# Patient Record
(demographics unavailable — no encounter records)

---

## 2025-04-25 NOTE — HISTORY OF PRESENT ILLNESS
[de-identified] : 38 y/o F presents with sinus pain fluctuating for 8 months. Currently with 2-3 days of sinus pain, left>right malar pressure, pressure along left eyebrow, upper left dental pain, and left aural fullness. Associated nasal congestion, yellow/green rhinorrhea and postnasal drainage, decreased smell. Fatigue as well. No fevers, chills. Known seasonal allergies in the Spring, this season not as bad as usual. Symptoms started with allergic sx and increased sneezing. Took sudafed this morning. Netipot daily. No flonase. Occasional allegra.   Reports pattern of symptoms frequent colds since September, typically only one per year and she is concerned she has a sinus infection. History of sinusitis, last antibiotics in 2016. No prior sinus surgery. History of chronic sinusitis age 12, treated with 1 month of oral antibiotics. Reports History of epistaxis, eletrocautery in 2016. Known bruxism wears a bite splint for clenching. Nonsmoker. No fh relevant to cc.

## 2025-04-25 NOTE — PHYSICAL EXAM
[Midline] : trachea located in midline position [Nasal Endoscopy Performed] : nasal endoscopy was performed, see procedure section for findings [de-identified] : engorged [Normal] : no rashes [de-identified] : gait steady

## 2025-04-25 NOTE — PROCEDURE
[Posterior Lesion] : posterior lesion [Anterior rhinoscopy insufficient to account for symptoms] : anterior rhinoscopy insufficient to account for symptoms [Topical Lidocaine] : topical lidocaine [Oxymetazoline HCl] : oxymetazoline HCl [Congested] : congested [Deviated to the Lt] : deviated to the left [] : purulence present on the left [Normal] : the nasopharynx was normal [de-identified] : done for nasal drainage, facial pressure to ro sinusitis, could not see with mirror [FreeTextEntry6] : - done to eval sinus pain, postnasal drip, hyposmia, congestion found to have l mucopurulent drainage posterior ethmoids consistent with sinusitis and left septal deviation  Mucosa: b congested Mucus:b nl; +l mucopurulent drainage posterior ethmoids  Polyps:b nl Inferior turbinate:b nl Middle turbinate:b nl Superior turbinate:b nl Inferior Meatus:b nl Middle Meatus:b nl Superior meatus:b nl Sphenoethmoidal recess:b nl

## 2025-04-25 NOTE — PHYSICAL EXAM
[Midline] : trachea located in midline position [Nasal Endoscopy Performed] : nasal endoscopy was performed, see procedure section for findings [de-identified] : engorged [Normal] : no rashes [de-identified] : gait steady

## 2025-04-25 NOTE — PROCEDURE
[Posterior Lesion] : posterior lesion [Anterior rhinoscopy insufficient to account for symptoms] : anterior rhinoscopy insufficient to account for symptoms [Topical Lidocaine] : topical lidocaine [Oxymetazoline HCl] : oxymetazoline HCl [Congested] : congested [Deviated to the Lt] : deviated to the left [] : purulence present on the left [Normal] : the nasopharynx was normal [de-identified] : done for nasal drainage, facial pressure to ro sinusitis, could not see with mirror [FreeTextEntry6] : - done to eval sinus pain, postnasal drip, hyposmia, congestion found to have l mucopurulent drainage posterior ethmoids consistent with sinusitis and left septal deviation  Mucosa: b congested Mucus:b nl; +l mucopurulent drainage posterior ethmoids  Polyps:b nl Inferior turbinate:b nl Middle turbinate:b nl Superior turbinate:b nl Inferior Meatus:b nl Middle Meatus:b nl Superior meatus:b nl Sphenoethmoidal recess:b nl

## 2025-04-25 NOTE — ASSESSMENT
[FreeTextEntry1] : sinusitis -found to have l mucopurulent drainage posterior ethmoids consistent with sinusitis and left septal deviation -rec urgent care for rapid resp viral panel -saline irrigations -flonase 2 sprays bid -afrin x 4 days -ZPAK (pen allergic) -probiotics for GI side effects ppx  recurrent URIs -given contact information for ID specialist for further work up  RTC 2 weeks with CT sinus post treatment

## 2025-04-25 NOTE — HISTORY OF PRESENT ILLNESS
[de-identified] : 36 y/o F presents with sinus pain fluctuating for 8 months. Currently with 2-3 days of sinus pain, left>right malar pressure, pressure along left eyebrow, upper left dental pain, and left aural fullness. Associated nasal congestion, yellow/green rhinorrhea and postnasal drainage, decreased smell. Fatigue as well. No fevers, chills. Known seasonal allergies in the Spring, this season not as bad as usual. Symptoms started with allergic sx and increased sneezing. Took sudafed this morning. Netipot daily. No flonase. Occasional allegra.   Reports pattern of symptoms frequent colds since September, typically only one per year and she is concerned she has a sinus infection. History of sinusitis, last antibiotics in 2016. No prior sinus surgery. History of chronic sinusitis age 12, treated with 1 month of oral antibiotics. Reports History of epistaxis, eletrocautery in 2016. Known bruxism wears a bite splint for clenching. Nonsmoker. No fh relevant to cc.

## 2025-05-13 NOTE — PHYSICAL EXAM
[de-identified] : scant l posterior drainage opaque seen [Midline] : trachea located in midline position [Normal] : no rashes

## 2025-05-13 NOTE — HISTORY OF PRESENT ILLNESS
[de-identified] : 3 week F/U visit for this 36yo F with h/o chronic sinusitis, epistaxis, DNS & bruxism. At last visit, dx'd with acute sinusitis -> Rx'd afrin x4 days, flonase 2 spray bid, & Z angelica. CT sinuses ordered. She reports significant improvement. She notes she had left upper tooth/palate pain prior to abx. Uses neti pot but not for 2 weeks. Took a trip and inhalant allergies improved.

## 2025-05-13 NOTE — ASSESSMENT
[FreeTextEntry1] : residual l maxillary sinusitis hold off neti pot flonase had zpack; pen allergic doxycycline pro antibiotics rtc 2-3 weeks - asked to call for issues in the interim
[FreeTextEntry1] : residual l maxillary sinusitis hold off neti pot flonase had zpack; pen allergic doxycycline pro antibiotics rtc 2-3 weeks - asked to call for issues in the interim
(2) good, crying

## 2025-05-13 NOTE — DATA REVIEWED
[de-identified] : ct reviewed images with pt; I reviewed radiologist report once ready 1. Left maxillary sinus: Mild layering fluid and mild mucosal thickening. Partially opacified infraorbital ethmoidal air cell (Daniel cell) and low-lying ethmoid bulla with blockage of the ostiomeatal unit.  Right maxillary sinus: Trace mucosal thickening and tiny opacified infraorbital ethmoidal air cell (Daniel cell). Low lying ethmoid bulla. Narrowing of the ostiomeatal unit.  2. Mild mucosal thickening in the ethmoidal air cells.  3. Agger nasi cells/frontal air cells contribute to narrowing of the bilateral frontal sinus ostia/frontoethmoidal recesses.  4. Chronic undulating deviation of bony nasal septum with hypertrophic biapical nasal spurring impinging the nasal drainage pathways. There is also chronic deviation of cartilaginous nasal septum and narrowing of the nasal airways. Hypertrophic nasal turbinates/mild rhinitis. Small right-sided john bullosa.   5. Mild degenerative changes of the bilateral temporomandibular joints.  6. Mildly prominent calcified stylohyoid ligaments which can present with Eagle syndrome.

## 2025-05-13 NOTE — DATA REVIEWED
[de-identified] : ct reviewed images with pt; I reviewed radiologist report once ready 1. Left maxillary sinus: Mild layering fluid and mild mucosal thickening. Partially opacified infraorbital ethmoidal air cell (Daniel cell) and low-lying ethmoid bulla with blockage of the ostiomeatal unit.  Right maxillary sinus: Trace mucosal thickening and tiny opacified infraorbital ethmoidal air cell (Daniel cell). Low lying ethmoid bulla. Narrowing of the ostiomeatal unit.  2. Mild mucosal thickening in the ethmoidal air cells.  3. Agger nasi cells/frontal air cells contribute to narrowing of the bilateral frontal sinus ostia/frontoethmoidal recesses.  4. Chronic undulating deviation of bony nasal septum with hypertrophic biapical nasal spurring impinging the nasal drainage pathways. There is also chronic deviation of cartilaginous nasal septum and narrowing of the nasal airways. Hypertrophic nasal turbinates/mild rhinitis. Small right-sided john bullosa.   5. Mild degenerative changes of the bilateral temporomandibular joints.  6. Mildly prominent calcified stylohyoid ligaments which can present with Eagle syndrome.

## 2025-05-13 NOTE — PHYSICAL EXAM
[de-identified] : scant l posterior drainage opaque seen [Midline] : trachea located in midline position [Normal] : no rashes

## 2025-05-13 NOTE — HISTORY OF PRESENT ILLNESS
[de-identified] : 3 week F/U visit for this 36yo F with h/o chronic sinusitis, epistaxis, DNS & bruxism. At last visit, dx'd with acute sinusitis -> Rx'd afrin x4 days, flonase 2 spray bid, & Z angelica. CT sinuses ordered. She reports significant improvement. She notes she had left upper tooth/palate pain prior to abx. Uses neti pot but not for 2 weeks. Took a trip and inhalant allergies improved.